# Patient Record
(demographics unavailable — no encounter records)

---

## 2020-06-04 NOTE — NUR
PT AMBULATORY TO ROOM 15 W/ C/O R PUBIC AREA PAIN X 1 WEEK. PT STATES SHE HAS 
ALWAYS HAD PAIN SINCE HER C SECTION 8 YRS AGO BUT THAT OVER THE PAST WEEK IT 
HAS GOTTEN PROGRESSIVELY WORSE. PT ALSO STATES SHE HAS NOTED A MASS TO CORNER 
OF C SECTION SCAR THAT HAS INCREASED IN SIZE FROM A PEA TO A GOLF BALL OVER THE 
PAST FEW YEARS. PT RESTING ON SAUD. MARY. ULISES NICHOLSON AT BEDSIDE.

## 2020-09-20 NOTE — NUR
TASK RN: PT HAS SCAR TISSUE LLQ THAT SHE BELIEVES IS FROM . PT STATES 
SHE WAS TOLD THERE IS A "MASS" THERE A COUPLE MONTHS AGO. FOR 2 DAYS SHE HAS 
BEEN FEELING TEARING PAIN. MEDICATED AS NOTED ON MAR AND TO CT VIA SAUD

## 2020-09-23 NOTE — NUR
PT PRESENTS TO ED WITH CHRONIC BILATERAL LOWER ABD PAIN, WORSE TO RT LOWER 
QUAD. PT REPORTS EMESIS X 1 THIS AM BUT OTHERWISE DENIES N/V/D. PT IS A&O, 
RESPS EVEN AND UNLABORED. BP AND SPO2 MONITORS IN PLACE. CALL LIGHT IN REACH. 
PT SEEN AND EXAMINED BY [facility] [employer], PIV TO BE PLACED FOR CT AND MEDS. PT 
AGREEABLE TO POC.

## 2020-09-23 NOTE — NUR
PT REPORTS ABD PAIN LEVEL NOW 7/10, TOLERABLE. PT A&O, RESPS EVEN AND 
UNLABORED. AWAITING DC PAPERWORK FROM MD AT THIS TIME.

## 2020-09-23 NOTE — NUR
PT TAKEN TO CT BY THIS RN, RN ON STANDBY DURING CT SCAN (POLICY IF USING EJ). 
PT TAKEN BACK FROM CT BY THIS RN, PT TOLERATED WELL. NO S/SX INFILTRATION WITH 
CT CONTRAST ADMIN. PT BACK FROM CT NOW, STATES PAIN REMAINS AT 8/10, "THE 
MORPHINE BARELY HELPED." PT A&O, RESPS EVEN AND UNLABORED, NO N/V, PT IN GOOD 
SPIRITS. AWAITING CT SCAN AN DISPO.

## 2020-09-23 NOTE — NUR
PIV attempted to arm x 1 without success, pt states she has very poor venous 
access d/t hx IV drug use. Pt consents to EJ PIV placement, [facility] [employer] notified. 
rt EJ placed on first attempt by paramedic student with this RN's direct 
supervision. EJ flushes easily with good blood return. labs drawn and walked to 
lab. pt medicated per emar for morphine for 8/10 abd pain, tolerated well. bp 
and spo2 monitors in place. [facility] [employer] notified labs pending. Per MD , CT scan 
may be performed without labs resulted as pt has documented normal kidney 
function [address] called to notify, per CT no scanner available at this 
time. CT to notify RN when CT room ready.